# Patient Record
Sex: MALE | Race: WHITE | NOT HISPANIC OR LATINO | Employment: FULL TIME | URBAN - METROPOLITAN AREA
[De-identification: names, ages, dates, MRNs, and addresses within clinical notes are randomized per-mention and may not be internally consistent; named-entity substitution may affect disease eponyms.]

---

## 2024-05-13 ENCOUNTER — OFFICE VISIT (OUTPATIENT)
Dept: CARDIOLOGY | Facility: CLINIC | Age: 64
End: 2024-05-13
Payer: COMMERCIAL

## 2024-05-13 VITALS
OXYGEN SATURATION: 96 % | SYSTOLIC BLOOD PRESSURE: 90 MMHG | DIASTOLIC BLOOD PRESSURE: 56 MMHG | BODY MASS INDEX: 24.55 KG/M2 | HEIGHT: 68 IN | HEART RATE: 66 BPM | WEIGHT: 162 LBS

## 2024-05-13 DIAGNOSIS — I25.10 MILD CAD: ICD-10-CM

## 2024-05-13 DIAGNOSIS — I48.0 PAROXYSMAL ATRIAL FIBRILLATION (MULTI): Primary | ICD-10-CM

## 2024-05-13 DIAGNOSIS — E78.5 HYPERLIPIDEMIA, UNSPECIFIED HYPERLIPIDEMIA TYPE: ICD-10-CM

## 2024-05-13 DIAGNOSIS — I34.0 NONRHEUMATIC MITRAL VALVE REGURGITATION: ICD-10-CM

## 2024-05-13 PROBLEM — U07.1 COVID-19: Status: ACTIVE | Noted: 2024-05-13

## 2024-05-13 PROCEDURE — 99214 OFFICE O/P EST MOD 30 MIN: CPT | Performed by: INTERNAL MEDICINE

## 2024-05-13 PROCEDURE — 1036F TOBACCO NON-USER: CPT | Performed by: INTERNAL MEDICINE

## 2024-05-13 RX ORDER — METOPROLOL TARTRATE 50 MG/1
TABLET ORAL
COMMUNITY
Start: 2023-07-12

## 2024-05-13 RX ORDER — FLECAINIDE ACETATE 50 MG/1
50 TABLET ORAL 2 TIMES DAILY
Qty: 60 TABLET | Refills: 11 | Status: SHIPPED | OUTPATIENT
Start: 2024-05-13 | End: 2025-05-13

## 2024-05-13 RX ORDER — MULTIVIT WITH IRON,MINERALS
100 TABLET ORAL
COMMUNITY

## 2024-05-13 ASSESSMENT — ENCOUNTER SYMPTOMS
PALPITATIONS: 1
IRREGULAR HEARTBEAT: 1
DYSPNEA ON EXERTION: 1

## 2024-05-13 NOTE — PATIENT INSTRUCTIONS
Start flecainide 50 mg twice daily    Stop niacin    Stop by our [place] office in two weeks to get a one week Holter monitor.    We will see you back in 4-6 weeks to review the Holter monitor.    If you are still having atrial fibrillation, we will refer you for an ablation

## 2024-05-13 NOTE — PROGRESS NOTES
"Subjective   Jay Marroquin is a 63 y.o. male.    Chief Complaint:  Paroxysmal atrial fibrillation.    HPI    He is having more episodes of paroxysmal atrial fibrillation.  He is very symptomatic when he goes into atrial fibrillation.  He gets palpitations and tachycardia.  Becomes very fatigued he has to sit down and rest.  Sometimes it would last for hours.  It occurs several times per week and appears to be increasing in frequency.    He presented on December 15 with generalized fatigue and weakness. He also noted palpitations and a rapid heartbeat. He is found to have Covid and was also found to be in atrial fibrillation with a rapid ventricular response. He was placed on beta-blocker therapy. He converted to sinus rhythm.      His cardiac history is otherwise unremarkable. He has no other risk factors for coronary artery disease.     Social history: He is a non-smoker and is never smoked.     Allergies  Medication    · No Known Drug Allergies   Recorded By: Thania Dewey; 1/6/2022 9:39:28 AM     Family History  Mother    · Family history of atrial fibrillation (V17.49) (Z82.49)     Social History  Problems    · Does not use illicit drugs (V49.89) (Z78.9)   · Never a smoker   · Occasional alcohol use    Review of Systems   Constitutional: Positive for malaise/fatigue.   Cardiovascular:  Positive for dyspnea on exertion, irregular heartbeat and palpitations.   All other systems reviewed and are negative.         Visit Vitals  BP 90/56 (BP Location: Left arm)   Pulse 66   Ht 1.727 m (5' 8\")   Wt 73.5 kg (162 lb)   SpO2 96%   BMI 24.63 kg/m²   Smoking Status Never   BSA 1.88 m²        Objective     Constitutional:       Appearance: Not in distress.   Neck:      Vascular: JVD normal.   Pulmonary:      Breath sounds: Normal breath sounds.   Cardiovascular:      Normal rate. Regular rhythm. S1 with normal intensity. S2 with normal intensity.       Murmurs: There is no murmur.      No gallop.    Pulses:     Intact distal " pulses.   Edema:     Peripheral edema absent.   Abdominal:      General: Bowel sounds are normal.   Neurological:      Mental Status: Alert and oriented to person, place and time.         Lab Review:   Lab Results   Component Value Date     (L) 12/15/2021    K 4.2 12/15/2021     12/15/2021    CO2 23 12/15/2021    BUN 22 12/15/2021    CREATININE 1.09 12/15/2021    GLUCOSE 98 12/15/2021    CALCIUM 7.8 (L) 12/15/2021       Assessment:    1.  Atrial fibrillation.  Paroxysmal atrial fibrillation currently in sinus rhythm on today's visit.  Will add flecainide 50 mg twice daily.  The patient has minimal coronary artery disease based on prior CT calcium scoring.  Should he have a breakthrough with the beta-blocker and atrial fibrillation we will refer him for an ablation procedure.    2.  Mild CAD.  Minimal by CT calcium scoring.    3.  Hyperlipidemia.  Cholesterol is 102, HDL 11, LDL 59.    4.  Mitral regurgitation.  No evidence of heart failure.

## 2024-05-24 ENCOUNTER — ANCILLARY PROCEDURE (OUTPATIENT)
Dept: CARDIOLOGY | Facility: CLINIC | Age: 64
End: 2024-05-24
Payer: COMMERCIAL

## 2024-05-24 DIAGNOSIS — I48.0 PAROXYSMAL ATRIAL FIBRILLATION (MULTI): ICD-10-CM

## 2024-06-26 ENCOUNTER — APPOINTMENT (OUTPATIENT)
Dept: CARDIOLOGY | Facility: CLINIC | Age: 64
End: 2024-06-26
Payer: COMMERCIAL

## 2024-06-26 VITALS
DIASTOLIC BLOOD PRESSURE: 60 MMHG | HEIGHT: 68 IN | WEIGHT: 163 LBS | HEART RATE: 74 BPM | SYSTOLIC BLOOD PRESSURE: 96 MMHG | BODY MASS INDEX: 24.71 KG/M2 | OXYGEN SATURATION: 97 %

## 2024-06-26 DIAGNOSIS — I34.0 NONRHEUMATIC MITRAL VALVE REGURGITATION: ICD-10-CM

## 2024-06-26 DIAGNOSIS — I48.0 PAROXYSMAL ATRIAL FIBRILLATION (MULTI): ICD-10-CM

## 2024-06-26 DIAGNOSIS — I25.10 ATHEROSCLEROSIS OF NATIVE CORONARY ARTERY OF NATIVE HEART WITHOUT ANGINA PECTORIS: Primary | ICD-10-CM

## 2024-06-26 DIAGNOSIS — E78.5 HYPERLIPIDEMIA, UNSPECIFIED HYPERLIPIDEMIA TYPE: ICD-10-CM

## 2024-06-26 PROCEDURE — 1159F MED LIST DOCD IN RCRD: CPT | Performed by: INTERNAL MEDICINE

## 2024-06-26 PROCEDURE — 1036F TOBACCO NON-USER: CPT | Performed by: INTERNAL MEDICINE

## 2024-06-26 PROCEDURE — 99213 OFFICE O/P EST LOW 20 MIN: CPT | Performed by: INTERNAL MEDICINE

## 2024-06-26 RX ORDER — METOPROLOL SUCCINATE 25 MG/1
12.5 TABLET, EXTENDED RELEASE ORAL DAILY
COMMUNITY

## 2024-06-26 ASSESSMENT — ENCOUNTER SYMPTOMS: PALPITATIONS: 1

## 2024-06-26 NOTE — PROGRESS NOTES
"Subjective   Jay Marroquin is a 64 y.o. male.    Chief Complaint:  Follow-up atrial fibrillation.    HPI    On his last visit he noted more episodes of atrial fibrillation.  We started flecainide 50 mg twice daily.  He is here for follow-up.  Since initiation of therapy he has had no further episodes of atrial fibrillation.  We also did a Holter monitor.  He is here for follow-up of the results.    He presented on December 15 with generalized fatigue and weakness. He also noted palpitations and a rapid heartbeat. He is found to have Covid and was also found to be in atrial fibrillation with a rapid ventricular response. He was placed on beta-blocker therapy. He converted to sinus rhythm.      His cardiac history is otherwise unremarkable. He has no other risk factors for coronary artery disease.     Social history: He is a non-smoker and is never smoked.      Allergies  Medication    · No Known Drug Allergies   Recorded By: Thania Dewey; 1/6/2022 9:39:28 AM     Family History  Mother    · Family history of atrial fibrillation (V17.49) (Z82.49)     Social History  Problems    · Does not use illicit drugs (V49.89) (Z78.9)   · Never a smoker   · Occasional alcohol use    Review of Systems   Cardiovascular:  Positive for palpitations.   All other systems reviewed and are negative.      Current Outpatient Medications   Medication Sig Dispense Refill    flecainide (Tambocor) 50 mg tablet Take 1 tablet (50 mg) by mouth 2 times a day. 60 tablet 11    metoprolol succinate XL (Toprol-XL) 25 mg 24 hr tablet Take 0.5 tablets (12.5 mg) by mouth once daily.      niacin 100 mg tablet Take 1 tablet (100 mg) by mouth once daily with breakfast.      TURMERIC ORAL Take by mouth.      vitamin E, dl,tocopheryl acet, (VITAMIN E, DL, ACETATE,, BULK, MISC)        No current facility-administered medications for this visit.        Visit Vitals  BP 96/60 (BP Location: Left arm)   Pulse 74   Ht 1.727 m (5' 8\")   Wt 73.9 kg (163 lb)   SpO2 97% "   BMI 24.78 kg/m²   Smoking Status Never   BSA 1.88 m²        Objective     Constitutional:       Appearance: Not in distress.   Neck:      Vascular: JVD normal.   Pulmonary:      Breath sounds: Normal breath sounds.   Cardiovascular:      Normal rate. Regular rhythm. S1 with normal intensity. S2 with normal intensity.       Murmurs: There is no murmur.      No gallop.    Pulses:     Intact distal pulses.   Edema:     Peripheral edema absent.   Abdominal:      General: Bowel sounds are normal.   Neurological:      Mental Status: Alert and oriented to person, place and time.         Lab Review:   Lab Results   Component Value Date     (L) 12/15/2021    K 4.2 12/15/2021     12/15/2021    CO2 23 12/15/2021    BUN 22 12/15/2021    CREATININE 1.09 12/15/2021    GLUCOSE 98 12/15/2021    CALCIUM 7.8 (L) 12/15/2021     Lab Results   Component Value Date    CHOL 89 12/15/2021    TRIG 69 12/15/2021    HDL 23.0 (A) 12/15/2021       Assessment:    1.  Paroxysmal atrial fibrillation.  Under good control on flecainide therapy.  Our plan is to continue flecainide.  Should he have a breakthrough on flecainide, the best option will be a pulmonary vein isolation procedure.    2.  Hypotension.  Relatively low blood pressures.  He is asymptomatic.    3.  Coronary disease risk factors.  A CT coronary calcium score in 2022 was 5 consistent with presence of minimal coronary disease.

## 2024-07-20 DIAGNOSIS — I48.0 PAROXYSMAL ATRIAL FIBRILLATION (MULTI): Primary | ICD-10-CM

## 2024-07-23 RX ORDER — METOPROLOL SUCCINATE 25 MG/1
12.5 TABLET, EXTENDED RELEASE ORAL DAILY
Qty: 45 TABLET | Refills: 3 | Status: SHIPPED | OUTPATIENT
Start: 2024-07-23

## 2024-07-29 ENCOUNTER — TELEPHONE (OUTPATIENT)
Dept: CARDIOLOGY | Facility: CLINIC | Age: 64
End: 2024-07-29
Payer: COMMERCIAL

## 2024-07-29 DIAGNOSIS — I48.0 PAROXYSMAL ATRIAL FIBRILLATION (MULTI): Primary | ICD-10-CM

## 2024-07-29 RX ORDER — METOPROLOL SUCCINATE 25 MG/1
12.5 TABLET, EXTENDED RELEASE ORAL DAILY
Qty: 45 TABLET | Refills: 3 | Status: SHIPPED | OUTPATIENT
Start: 2024-07-29

## 2024-07-29 NOTE — TELEPHONE ENCOUNTER
Pt LM report he is starting chemotherapy for lymphoma at the end of this week.     Reviewed records from [facility]. 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    Per [facility] records, pt had an echo on 7/26/24. Echo results can be found in the encounters tab on 7/26/24.    Meds: Toprol 25mg 1/2 tab daily, flecainide 50mg twice a day,     Pt asking if there are any concerns from a cardiac perspective with chemotherapy?     Next ov  12/19/24.    Please advise. Thanks.

## 2024-08-01 ENCOUNTER — PATIENT MESSAGE (OUTPATIENT)
Dept: CARDIOLOGY | Facility: CLINIC | Age: 64
End: 2024-08-01
Payer: COMMERCIAL

## 2024-08-01 DIAGNOSIS — I95.9 HYPOTENSION, UNSPECIFIED HYPOTENSION TYPE: Primary | ICD-10-CM

## 2024-08-07 RX ORDER — FLUDROCORTISONE ACETATE 0.1 MG/1
0.1 TABLET ORAL DAILY
Qty: 90 TABLET | Refills: 3 | Status: SHIPPED | OUTPATIENT
Start: 2024-08-07 | End: 2025-08-07

## 2024-08-09 ENCOUNTER — TELEPHONE (OUTPATIENT)
Dept: CARDIOLOGY | Facility: CLINIC | Age: 64
End: 2024-08-09
Payer: COMMERCIAL

## 2024-08-09 DIAGNOSIS — I48.0 PAROXYSMAL ATRIAL FIBRILLATION (MULTI): ICD-10-CM

## 2024-08-09 NOTE — TELEPHONE ENCOUNTER
Pt called to review current medications. Pt verified that he is taking Fludrocortisone 0.1mg once a day, Metoprolol Succ 25mg 1/2 tablet daily, Flecainide 50mg twice a day.    Pt states he has been having breakthrough episodes of A-fib every 2-3 days. Pt states his finger pulse ox indicates an irregular rhythm and he can feel his heart pounding and feels jittery during these episodes.    Pt states HR has been under 100.    Pt recently started chemo and was having low BP's so Dr. Stoddard started Fludrocortisone 0.1mg daily. Pt did not have any BP readings.    Next ov 12/19/24.    Please advise. Thanks.

## 2024-08-12 RX ORDER — FLECAINIDE ACETATE 50 MG/1
100 TABLET ORAL 2 TIMES DAILY
COMMUNITY
Start: 2024-08-12 | End: 2024-08-13 | Stop reason: SDUPTHER

## 2024-08-13 ENCOUNTER — TELEPHONE (OUTPATIENT)
Dept: CARDIOLOGY | Facility: CLINIC | Age: 64
End: 2024-08-13
Payer: COMMERCIAL

## 2024-08-13 DIAGNOSIS — I48.0 PAROXYSMAL ATRIAL FIBRILLATION (MULTI): Primary | ICD-10-CM

## 2024-08-13 RX ORDER — FLECAINIDE ACETATE 100 MG/1
100 TABLET ORAL 2 TIMES DAILY
Qty: 180 TABLET | Refills: 3 | COMMUNITY
Start: 2024-08-13

## 2024-08-13 NOTE — TELEPHONE ENCOUNTER
Called pt to discuss. Pt states he went to  Flecainide rx and was told by [facility] that it was cancelled. Pt aware to increase Flecainide from 50mg twice a day to 100mg twice a day. Pt was using 50mg tablets until 100mg tablets were ready for . Called [facility] and spoke to Derrell, pharmacist. I clarified and provided verbal order for Fleciainide order: 100mg take 1 tablet twice a day, #90 with 3 refills. Pt notified [facility] will prepare new rx.

## 2024-08-13 NOTE — TELEPHONE ENCOUNTER
Answering service message:  Went to  Rx for Flecainide Acetate 50mg 1 tab 2x aday. Pharm called and said this is double his Rx and they cancelled it [facility] [address]

## 2024-12-09 PROBLEM — C83.37: Status: ACTIVE | Noted: 2024-07-18

## 2024-12-19 ENCOUNTER — APPOINTMENT (OUTPATIENT)
Dept: CARDIOLOGY | Facility: CLINIC | Age: 64
End: 2024-12-19
Payer: COMMERCIAL

## 2024-12-19 VITALS
HEIGHT: 68 IN | DIASTOLIC BLOOD PRESSURE: 76 MMHG | SYSTOLIC BLOOD PRESSURE: 124 MMHG | WEIGHT: 164 LBS | HEART RATE: 62 BPM | OXYGEN SATURATION: 100 % | BODY MASS INDEX: 24.86 KG/M2

## 2024-12-19 DIAGNOSIS — E78.5 HYPERLIPIDEMIA, UNSPECIFIED HYPERLIPIDEMIA TYPE: ICD-10-CM

## 2024-12-19 DIAGNOSIS — I48.0 PAROXYSMAL ATRIAL FIBRILLATION (MULTI): Primary | ICD-10-CM

## 2024-12-19 DIAGNOSIS — I34.0 NONRHEUMATIC MITRAL VALVE REGURGITATION: ICD-10-CM

## 2024-12-19 DIAGNOSIS — I25.10 ATHEROSCLEROSIS OF NATIVE CORONARY ARTERY OF NATIVE HEART WITHOUT ANGINA PECTORIS: ICD-10-CM

## 2024-12-19 LAB
ATRIAL RATE: 57 BPM
P AXIS: 74 DEGREES
P OFFSET: 187 MS
P ONSET: 124 MS
PR INTERVAL: 184 MS
Q ONSET: 216 MS
QRS COUNT: 10 BEATS
QRS DURATION: 108 MS
QT INTERVAL: 448 MS
QTC CALCULATION(BAZETT): 436 MS
QTC FREDERICIA: 440 MS
R AXIS: 54 DEGREES
T AXIS: 57 DEGREES
T OFFSET: 440 MS
VENTRICULAR RATE: 57 BPM

## 2024-12-19 PROCEDURE — 1036F TOBACCO NON-USER: CPT | Performed by: INTERNAL MEDICINE

## 2024-12-19 PROCEDURE — 3008F BODY MASS INDEX DOCD: CPT | Performed by: INTERNAL MEDICINE

## 2024-12-19 PROCEDURE — 99213 OFFICE O/P EST LOW 20 MIN: CPT | Performed by: INTERNAL MEDICINE

## 2024-12-19 PROCEDURE — 93005 ELECTROCARDIOGRAM TRACING: CPT | Performed by: INTERNAL MEDICINE

## 2024-12-19 PROCEDURE — 1159F MED LIST DOCD IN RCRD: CPT | Performed by: INTERNAL MEDICINE

## 2024-12-19 NOTE — PROGRESS NOTES
Subjective   Jay Marroquin is a 64 y.o. male.    Chief Complaint:  Follow-up paroxysmal atrial fibrillation.    HPI    He was undergoing treatment for B-cell lymphoma.  He was noted to have lymphadenopathy and underwent a biopsy with confirmation of the diagnosis.  He has been undergoing chemotherapy.  From a cardiac standpoint he is doing well.  No palpitations or tachycardia.  No problems with his medications.    Patient had a coronary CT calcium score 2022 which was 5 consistent with minimal coronary disease.    He presented on December 15 with generalized fatigue and weakness. He also noted palpitations and a rapid heartbeat. He is found to have Covid and was also found to be in atrial fibrillation with a rapid ventricular response. He was placed on beta-blocker therapy. He converted to sinus rhythm.      His cardiac history is otherwise unremarkable. He has no other risk factors for coronary artery disease.     Social history: He is a non-smoker and is never smoked.      Allergies  Medication    · No Known Drug Allergies     Family History  Mother    · Family history of atrial fibrillation (V17.49) (Z82.49)     Social History   · Never a smoker   · Occasional alcohol use     Review of Systems   Cardiovascular:  Positive for palpitations.   All other systems reviewed and are negative.    Current Outpatient Medications   Medication Sig Dispense Refill    flecainide (Tambocor) 100 mg tablet Take 1 tablet (100 mg) by mouth 2 times a day. 180 tablet 3    fludrocortisone (Florinef) 0.1 mg tablet Take 1 tablet (0.1 mg) by mouth once daily. 90 tablet 3    metoprolol succinate XL (Toprol-XL) 25 mg 24 hr tablet TAKE ONE-HALF TABLET BY MOUTH ONE TIME DAILY 45 tablet 3    TURMERIC ORAL Take by mouth.      vitamin E, dl,tocopheryl acet, (VITAMIN E, DL, ACETATE,, BULK, MISC)        No current facility-administered medications for this visit.        Visit Vitals  /76 (BP Location: Right arm)   Pulse 62   Ht 1.727 m (5'  "8\")   Wt 74.4 kg (164 lb)   SpO2 100%   BMI 24.94 kg/m²   Smoking Status Never   BSA 1.89 m²        Objective     Constitutional:       Appearance: Not in distress.   Neck:      Vascular: JVD normal.   Pulmonary:      Breath sounds: Normal breath sounds.   Cardiovascular:      Normal rate. Regular rhythm. S1 with normal intensity. S2 with normal intensity.       Murmurs: There is no murmur.      No gallop.    Pulses:     Intact distal pulses.   Edema:     Peripheral edema absent.   Abdominal:      General: Bowel sounds are normal.   Neurological:      Mental Status: Alert and oriented to person, place and time.         Assessment:    1.  Paroxysmal atrial fibrillation.  Continues to maintain sinus rhythm on a combination of low-dose metoprolol and flecainide.  Should he have a breakthrough he would be considered for a pulmonary vein isolation procedure.    2.  Coronary artery disease.  Minimal coronary disease.    3.  Hyperlipidemia.  Cholesterol 102, HDL 11, LDL 59.    4.  B-cell lymphoma.  Undergoing therapy.  "

## 2025-02-10 ENCOUNTER — PATIENT MESSAGE (OUTPATIENT)
Dept: CARDIOLOGY | Facility: CLINIC | Age: 65
End: 2025-02-10
Payer: COMMERCIAL

## 2025-02-11 DIAGNOSIS — I48.0 PAROXYSMAL ATRIAL FIBRILLATION (MULTI): ICD-10-CM

## 2025-02-11 RX ORDER — FLECAINIDE ACETATE 100 MG/1
100 TABLET ORAL 2 TIMES DAILY
Qty: 180 TABLET | Refills: 3 | Status: SHIPPED | OUTPATIENT
Start: 2025-02-11

## 2025-07-26 DIAGNOSIS — I48.0 PAROXYSMAL ATRIAL FIBRILLATION (MULTI): Primary | ICD-10-CM

## 2025-07-29 DIAGNOSIS — I95.9 HYPOTENSION, UNSPECIFIED HYPOTENSION TYPE: Primary | ICD-10-CM

## 2025-07-29 RX ORDER — METOPROLOL SUCCINATE 25 MG/1
12.5 TABLET, EXTENDED RELEASE ORAL DAILY
Qty: 45 TABLET | Refills: 3 | Status: SHIPPED | OUTPATIENT
Start: 2025-07-29

## 2025-07-30 RX ORDER — FLUDROCORTISONE ACETATE 0.1 MG/1
0.1 TABLET ORAL DAILY
Qty: 90 TABLET | Refills: 3 | Status: SHIPPED | OUTPATIENT
Start: 2025-07-30